# Patient Record
Sex: FEMALE | Race: ASIAN
[De-identification: names, ages, dates, MRNs, and addresses within clinical notes are randomized per-mention and may not be internally consistent; named-entity substitution may affect disease eponyms.]

---

## 2020-12-15 PROBLEM — Z00.00 ENCOUNTER FOR PREVENTIVE HEALTH EXAMINATION: Status: ACTIVE | Noted: 2020-12-15

## 2020-12-21 ENCOUNTER — APPOINTMENT (OUTPATIENT)
Dept: ORTHOPEDIC SURGERY | Facility: CLINIC | Age: 30
End: 2020-12-21

## 2020-12-21 ENCOUNTER — APPOINTMENT (OUTPATIENT)
Dept: ORTHOPEDIC SURGERY | Facility: CLINIC | Age: 30
End: 2020-12-21
Payer: COMMERCIAL

## 2020-12-21 VITALS — HEIGHT: 64 IN | WEIGHT: 161 LBS | BODY MASS INDEX: 27.49 KG/M2

## 2020-12-21 VITALS — TEMPERATURE: 96.8 F

## 2020-12-21 VITALS — HEART RATE: 78 BPM | DIASTOLIC BLOOD PRESSURE: 81 MMHG | SYSTOLIC BLOOD PRESSURE: 114 MMHG

## 2020-12-21 DIAGNOSIS — Z78.9 OTHER SPECIFIED HEALTH STATUS: ICD-10-CM

## 2020-12-21 PROCEDURE — 99072 ADDL SUPL MATRL&STAF TM PHE: CPT

## 2020-12-21 PROCEDURE — 99203 OFFICE O/P NEW LOW 30 MIN: CPT

## 2020-12-21 PROCEDURE — 73562 X-RAY EXAM OF KNEE 3: CPT | Mod: RT

## 2020-12-21 NOTE — PHYSICAL EXAM
[de-identified] : Oriented to time, place, person\par Mood: Normal\par Affect: Normal\par Appearance: Healthy, well appearing, no acute distress.\par Gait: Normal\par Assistive Devices: None \par \par Right Knee Exam:\par \par Skin: Clean, dry, intact\par Inspection: No obvious malalignment, no masses, no swelling, no effusion\par Pulses: 2+ DP/PT pulses \par ROM: 0-140 degrees of flexion. No pain with deep knee flexion/extension.\par Tenderness: + MJLT. No LJLT. No pain over the patella facets. No pain to the quadriceps tendon. No pain to the patella tendon. No posterior knee tenderness.\par Stability: Stable to varus, valgus. Negative Lachman testing. Negative anterior drawer, negative posterior drawer.\par Strength: 5/5 Q/H/TA/GS/EHL, without atrophy\par Neuro: Intact to light touch throughout, DTRs normal\par Additional Tests: Negative Torsten's test, Negative patellar grind test  [de-identified] : The following radiographs were ordered and read by me during this patients visit. I reviewed each radiograph in detail with the patient and discussed the findings as highlighted below. \par \par 4 views of the right knee were obtained today, 12/21/2020, that show no acute fracture or dislocation. There is no medial, no lateral and no patellofemoral degenerative changes seen. There is no significant malalignment. No significant other obvious osseous abnormality, otherwise unremarkable. \par \par MRI 2014 of right knee shows no evidence of internal derangement.

## 2020-12-21 NOTE — ADDENDUM
[FreeTextEntry1] : This note was written by Jhoana Moore on 12/21/2020 acting solely as a scribe for Dr. Rony Holcomb.\par \par All medical record entries made by the Scribe were at my, Dr. Rony Holcomb, direction and personally dictated by me on 12/21/2020. I have personally reviewed the chart and agree that the record accurately reflects my personal performance of the history, physical exam, assessment and plan.

## 2020-12-21 NOTE — HISTORY OF PRESENT ILLNESS
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 States that she was sitting with her knee bent in October as she strengthened her knee to get up felt a knot in her knee. Her PMD thought that she may have had a meniscus tear and gave her HEP to do which has been helpful. The pain is intermittent brought on with stairs and prolonged walking. Reports popping sound when getting up from seated position. Takes ibuprofen as needed. Denies locking, catching. locking, numbness or tingling. MRI in 2014 which showed meniscus tear  she was given a medication called PRISCILA which gave her relief until recently. Patient is moderately active, she kick boxes and runs but has not done so recently due to pain. \par \par The patient's past medical history, past surgical history, medications and allergies were reviewed by me today with the patient and documented accordingly. In addition, the patient's family and social history, which were noncontributory to this visit, were reviewed also.

## 2020-12-21 NOTE — DISCUSSION/SUMMARY
[de-identified] : 31 y/o female with right knee pain. \par \par Presentation is consistent with early degenerative change and irritation of the synovium surrounding the medial meniscus.  Clinically, patient does not have traumatic exposure or positive meniscal testing that is concerning for acute meniscal injury.  Prior MRI in 2014 was negative for any structural damage. \par \par Recommendation: Conservative care & observation; including gradual return to full impact activity as tolerated. Patient may also use OTC NSAIDs or acetaminophen as tolerated, with application of ice/heat to the area 2-3x daily for 20 minutes after periods of activity. \par \par Follow up as needed if pain persists for further evaluation and treatment.

## 2021-02-22 ENCOUNTER — APPOINTMENT (OUTPATIENT)
Dept: ORTHOPEDIC SURGERY | Facility: CLINIC | Age: 31
End: 2021-02-22
Payer: COMMERCIAL

## 2021-02-22 PROCEDURE — 73564 X-RAY EXAM KNEE 4 OR MORE: CPT | Mod: LT

## 2021-02-22 PROCEDURE — 99214 OFFICE O/P EST MOD 30 MIN: CPT

## 2021-02-22 PROCEDURE — 99072 ADDL SUPL MATRL&STAF TM PHE: CPT

## 2021-02-23 NOTE — PHYSICAL EXAM
[de-identified] : Oriented to time, place, person\par Mood: Normal\par Affect: Normal\par Appearance: Healthy, well appearing, no acute distress.\par Gait: Normal\par Assistive Devices: None\par \par Left Knee Exam:\par \par Skin: Clean, dry, intact\par Inspection: No obvious malalignment, no masses, no swelling, no effusion\par Pulses: 2+ DP/PT pulses \par ROM: 0-135 degrees of flexion. No pain with deep knee flexion/extension.\par Tenderness: + MJLT. No LJLT. + Pes anserine irritation. No pain over the patella facets. No pain to the quadriceps tendon. No pain to the patella tendon. No posterior knee tenderness.\par Stability: Stable to varus, valgus. Negative Lachman testing. Negative anterior drawer, negative posterior drawer.\par Strength: 5/5 Q/H/TA/GS/EHL, without atrophy\par Neuro: Intact to light touch throughout, DTRs normal\par Additional Tests: Negative Torsten's test, Negative patellar grind test  [de-identified] : The following radiographs were ordered and read by me during this patients visit. I reviewed each radiograph in detail with the patient and discussed the findings as highlighted below. \par \par 4 views of the left knee were obtained today, 02/22/2021, that show no acute fracture or dislocation. There is no medial, no lateral and no patellofemoral degenerative changes seen. There is no significant malalignment. No significant other obvious osseous abnormality, otherwise unremarkable.

## 2021-02-23 NOTE — DISCUSSION/SUMMARY
[de-identified] : 31 y/o female with left knee pain. \par \par A discussion was had with the patient regarding symptomatic treatment for her current complaints of left knee pain.  She has pain radiating through the medial joint, and I discussed that consideration would be for possible medial collateral ligament strain, hamstring irritation/pes bursitis, versus possible meniscal injury.  I doubt any significant internal derangement given the lack of trauma, and I have recommended a trial of conservative management with continued observation of symptoms.\par \par Recommendations; Conservative care & observation, this includes rest/activity avoidance until less symptomatic with subsequent gradual return to full activity as directed. Patient may also use OTC NSAID's or acetaminophen as tolerated, application of ice to the area 2-3x daily for 20 minutes after periods of activity, and elevate limb during periods of rest.\par \par Follow up 4 weeks if no continued improvement for possible consideration of additional imaging

## 2021-02-23 NOTE — ADDENDUM
[FreeTextEntry1] : This note was written by Jhoana Moore on 02/22/2021 acting solely as a scribe for Dr. Rony Holcomb.\par \par All medical record entries made by the Scribe were at my, Dr. Rony Holcomb, direction and personally dictated by me on 02/22/2021. I have personally reviewed the chart and agree that the record accurately reflects my personal performance of the history, physical exam, assessment and plan. 
no

## 2021-02-23 NOTE — HISTORY OF PRESENT ILLNESS
[de-identified] : 30 year old female who has been seen previously for right knee pain presents today with left knee pain x 3 weeks. States that she started to have pain after shoveling snow. The pain is brought on with walking and stair usage. Pain is localized to medial aspect of the knee. She is not taking pain medication. Here for check up. Denies buckling, catching, locking, numbness or tingling.

## 2021-04-19 ENCOUNTER — APPOINTMENT (OUTPATIENT)
Dept: ORTHOPEDIC SURGERY | Facility: CLINIC | Age: 31
End: 2021-04-19
Payer: COMMERCIAL

## 2021-04-19 DIAGNOSIS — M25.562 PAIN IN LEFT KNEE: ICD-10-CM

## 2021-04-19 PROCEDURE — 99213 OFFICE O/P EST LOW 20 MIN: CPT

## 2021-04-19 PROCEDURE — 99072 ADDL SUPL MATRL&STAF TM PHE: CPT

## 2021-04-20 PROBLEM — M25.562 ACUTE PAIN OF LEFT KNEE: Status: ACTIVE | Noted: 2021-02-23

## 2021-04-20 NOTE — PHYSICAL EXAM
[de-identified] : Oriented to time, place, person\par Mood: Normal\par Affect: Normal\par Appearance: Healthy, well appearing, no acute distress.\par Gait: Normal\par Assistive Devices: None\par \par Left Knee Exam:\par \par Skin: Clean, dry, intact\par Inspection: No obvious malalignment, no masses, no swelling, no effusion\par Pulses: 2+ DP/PT pulses \par ROM: 0-135 degrees of flexion. No pain with deep knee flexion/extension.\par Tenderness: + MJLT. No LJLT. + over the MCL. No pain over the patella facets. No pain to the quadriceps tendon. No pain to the patella tendon. No posterior knee tenderness.\par Stability: Stable to varus, valgus. Negative Lachman testing. Negative anterior drawer, negative posterior drawer.\par Strength: 5/5 Q/H/TA/GS/EHL, without atrophy\par Neuro: Intact to light touch throughout, DTRs normal\par Additional Tests: Negative Torsten's test, Negative patellar grind test  [de-identified] : 4 views of the left knee were obtained 02/22/2021, that show no acute fracture or dislocation. There is no medial, no lateral and no patellofemoral degenerative changes seen. There is no significant malalignment. No significant other obvious osseous abnormality, otherwise unremarkable.

## 2021-04-20 NOTE — DISCUSSION/SUMMARY
[de-identified] : 29 y/o female with left knee pain. \par \par Continuation of pain to the left knee, with pain radiating through the medial joint.  We had previously discussed that consideration would be for possible medial collateral ligament strain, hamstring irritation/pes bursitis, versus possible meniscal injury. Due to the persistence of symptoms, MRI Rx given to patient to further delineate any internal structural derangement to the knee. This will allow for better understanding of the severity of disease, and allow for better stratification of treatment strategies (surgical vs. non-surgical). Patient is agreeable to further imaging. \par \par Recommendations: NSAIDs/Ice PRN, Conservative management as discussed. \par \par Followup: After MRI

## 2021-04-20 NOTE — ADDENDUM
[FreeTextEntry1] : This note was written by Jhoana Moore on 04/19/2021 acting solely as a scribe for Dr. Rony Holcomb.\par \par All medical record entries made by the Scribe were at my, Dr. Rony Holcomb, direction and personally dictated by me on 04/19/2021. I have personally reviewed the chart and agree that the record accurately reflects my personal performance of the history, physical exam, assessment and plan.

## 2021-04-21 ENCOUNTER — OUTPATIENT (OUTPATIENT)
Dept: OUTPATIENT SERVICES | Facility: HOSPITAL | Age: 31
LOS: 1 days | End: 2021-04-21
Payer: COMMERCIAL

## 2021-04-21 ENCOUNTER — APPOINTMENT (OUTPATIENT)
Dept: MRI IMAGING | Facility: CLINIC | Age: 31
End: 2021-04-21
Payer: COMMERCIAL

## 2021-04-21 DIAGNOSIS — M25.562 PAIN IN LEFT KNEE: ICD-10-CM

## 2021-04-21 PROCEDURE — 73721 MRI JNT OF LWR EXTRE W/O DYE: CPT | Mod: 26,LT

## 2021-04-21 PROCEDURE — 73721 MRI JNT OF LWR EXTRE W/O DYE: CPT

## 2021-04-23 ENCOUNTER — NON-APPOINTMENT (OUTPATIENT)
Age: 31
End: 2021-04-23

## 2021-04-30 ENCOUNTER — NON-APPOINTMENT (OUTPATIENT)
Age: 31
End: 2021-04-30

## 2021-05-05 ENCOUNTER — TRANSCRIPTION ENCOUNTER (OUTPATIENT)
Age: 31
End: 2021-05-05

## 2022-11-17 NOTE — HISTORY OF PRESENT ILLNESS
[de-identified] : 30 year old female who has been seen previously for right knee pain presents today for follow up of left knee pain. Reports improvement of 30% since last visit with rest. Still continues to have pain with squatting and lunging. Here to seen what is the next step for her. States that she started to have pain after shoveling snow.  Pain is localized to medial aspect of the knee. She is not taking pain medication. Denies buckling, catching, locking, numbness or tingling. 
Kike Gusman

## 2023-02-27 ENCOUNTER — APPOINTMENT (OUTPATIENT)
Dept: ORTHOPEDIC SURGERY | Facility: CLINIC | Age: 33
End: 2023-02-27
Payer: COMMERCIAL

## 2023-02-27 VITALS
SYSTOLIC BLOOD PRESSURE: 130 MMHG | OXYGEN SATURATION: 97 % | HEIGHT: 64 IN | WEIGHT: 185 LBS | DIASTOLIC BLOOD PRESSURE: 89 MMHG | HEART RATE: 89 BPM | BODY MASS INDEX: 31.58 KG/M2

## 2023-02-27 DIAGNOSIS — M25.561 PAIN IN RIGHT KNEE: ICD-10-CM

## 2023-02-27 PROCEDURE — 99213 OFFICE O/P EST LOW 20 MIN: CPT

## 2023-02-28 PROBLEM — M25.561 RIGHT KNEE PAIN: Status: ACTIVE | Noted: 2020-12-21

## 2023-02-28 NOTE — HISTORY OF PRESENT ILLNESS
[de-identified] : 32 year old female presents today with right knee pain intermittent since October 2020. States that she was sitting with her knee bent and as she strengthened her knee to get up, felt a knot in her knee. Her PCP thought that she may have had a meniscus tear and gave her a HEP to do which had been helpful. The pain is intermittent brought on with stairs and prolonged walking. Reports popping sound when getting up from seated position. Takes ibuprofen as needed. Denies locking, catching. locking, numbness or tingling. MRI in 2014 which showed meniscus tear she was given a medication called PRISCILA which gave her relief at the time. Patient is moderately active, she kick boxes and runs but has not done so recently due to pain.

## 2023-02-28 NOTE — PHYSICAL EXAM
[de-identified] : Oriented to time, place, person\par Mood: Normal\par Affect: Normal\par Appearance: Healthy, well appearing, no acute distress.\par Gait: Normal\par Assistive Devices: None \par \par Right Knee Exam:\par \par Skin: Clean, dry, intact\par Inspection: No obvious malalignment, no masses, no swelling, no effusion\par Pulses: 2+ DP/PT pulses \par ROM: 0-140 degrees of flexion. No pain with deep knee flexion/extension.\par Tenderness: + MJLT. No LJLT. No pain over the patella facets. No pain to the quadriceps tendon. No pain to the patella tendon. No posterior knee tenderness.\par Stability: Stable to varus, valgus. Negative Lachman testing. Negative anterior drawer, negative posterior drawer.\par Strength: 5/5 Q/H/TA/GS/EHL, without atrophy\par Neuro: Intact to light touch throughout, DTRs normal\par Additional Tests: Negative Torsten's test, Negative patellar grind test  [de-identified] : The following radiographs were ordered and read by me during this patients visit. I reviewed each radiograph in detail with the patient and discussed the findings as highlighted below. \par \par 4 views of the bilateral knee were obtained today, 2/27/2023, that show no acute fracture or dislocation. There is no medial, no lateral and no patellofemoral degenerative changes seen. There is no significant malalignment. No significant other obvious osseous abnormality, otherwise unremarkable. \par \par MRI 2014 of right knee shows no evidence of internal derangement.

## 2023-02-28 NOTE — ADDENDUM
[FreeTextEntry1] : This note was written by Jhoana Moore on 02/27/2023 acting solely as a scribe for Dr. Rony Holcomb.\par \par All medical record entries made by the Scribe were at my, Dr. Rony Holcomb, direction and personally dictated by me on 02/27/2023. I have personally reviewed the chart and agree that the record accurately reflects my personal performance of the history, physical exam, assessment and plan.

## 2023-02-28 NOTE — DISCUSSION/SUMMARY
[de-identified] : 33 y/o female with right knee pain. \par \par The patient presents with clinical symptoms of diffuse anterior knee pain with dysfunction of the patellofemoral compartment. The patient's pain is likely mulitfactorial; including chondromalacia of the patellofemoral compartment, muscle weakness, limb malalignment, patella maltracking. I discussed at length the following nonoperative modalities of treatment for anterior knee pain/patellofemoral syndrome with the patient that includes anti-inflammatory medication, activity modification, and physical therapy. Physical therapy will include vastus medialis strengthening, close chain short arc quadriceps exercises as well as hip and core strengthening.  Prior MRI in 2014 was negative for any structural damage, there is no concern for any acute injury and acute internal derangement.\par \par Recommendation: Conservative care & observation; including gradual return to full impact activity as tolerated. Patient may also use OTC NSAIDs or acetaminophen as tolerated, with application of ice/heat to the area 2-3x daily for 20 minutes after periods of activity. \par \par Follow up as needed if pain persists for further evaluation and treatment.

## 2024-11-26 ENCOUNTER — APPOINTMENT (OUTPATIENT)
Age: 34
End: 2024-11-26

## 2024-11-26 VITALS — HEIGHT: 64 IN | WEIGHT: 190 LBS | BODY MASS INDEX: 32.44 KG/M2

## 2024-12-12 ENCOUNTER — APPOINTMENT (OUTPATIENT)
Dept: HUMAN REPRODUCTION | Facility: CLINIC | Age: 34
End: 2024-12-12

## 2025-01-07 ENCOUNTER — APPOINTMENT (OUTPATIENT)
Age: 35
End: 2025-01-07

## 2025-01-29 ENCOUNTER — APPOINTMENT (OUTPATIENT)
Age: 35
End: 2025-01-29

## 2025-01-29 VITALS — WEIGHT: 189 LBS | BODY MASS INDEX: 32.27 KG/M2 | HEIGHT: 64 IN
